# Patient Record
Sex: MALE | Race: ASIAN | NOT HISPANIC OR LATINO | Employment: STUDENT | URBAN - METROPOLITAN AREA
[De-identification: names, ages, dates, MRNs, and addresses within clinical notes are randomized per-mention and may not be internally consistent; named-entity substitution may affect disease eponyms.]

---
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
## 2024-06-17 PROBLEM — Z76.89 HEALTH CARE HOME: Status: RESOLVED | Noted: 2020-12-09 | Resolved: 2024-06-17

## 2025-02-27 ENCOUNTER — OFFICE VISIT (OUTPATIENT)
Dept: DERMATOLOGY | Facility: CLINIC | Age: 20
End: 2025-02-27
Payer: COMMERCIAL

## 2025-02-27 DIAGNOSIS — L74.519 FOCAL HYPERHIDROSIS: Primary | ICD-10-CM

## 2025-02-27 RX ORDER — GLYCOPYRROLATE 1 MG/1
1 TABLET ORAL 2 TIMES DAILY
Qty: 60 TABLET | Refills: 2 | Status: SHIPPED | OUTPATIENT
Start: 2025-02-27

## 2025-02-27 ASSESSMENT — PAIN SCALES - GENERAL: PAINLEVEL_OUTOF10: NO PAIN (0)

## 2025-02-27 NOTE — PROGRESS NOTES
[facility] Dermatology Note  Encounter Date: Feb 27, 2025  Office Visit     Dermatology Problem List:  1. Hyperhidrosis  ____________________________________________    Assessment & Plan:  # Hyperhidrosis.   Chronic stable, but becoming more of a social deterrent. We discussed several treatment options and the pros and cons of each including topical anti-perspirants, oral options (anti-cholinergics), iontophoresis.    - Oral glycopyrrolate start 1 mg BID; reviewed common potential side effects including sedation and other CNS anti-cholinergic effects  - Topical anti-perspirant: aluminum chloride 20% once daily to affected areas  - Can consider iontophoresis - provided with printed instructions re: options including DermaDry; understands this would be an out of pocket cost    Follow-up: 3 month(s) in-person, or earlier for new or changing lesions    Staff and Medical Student:     Radha Wood MS4 on 02/27/25    I was present with the medical student who participated in the service and in the documentation.  I have verified the history and personally performed the physical exam and medical decision making.  I agree with the assessment and plan of care as documented in the note.      Etienne Avery MD  Dermatology Attending  ____________________________________________    CC: Derm Problem (Pt reports excessive sweating, mostly on palms, feet, and armpits. )    HPI:  Mr. Aris Pires is a(n) 19 year old male who presents today as a new patient for chronic sweating palms, feet, armpits.    Hand sweatiness since a young boy; has been the same amount just more bothersome now with social interactions. Worse with nervous, increased heart rate when exercising, caffeine. Has tried hand washing frequently, showering, cold temperature which helps some.    No immediate family history of similar. Otherwise healthy, no medications.    Patient is otherwise feeling well, without additional skin  concerns.    Labs:  None reviewed.    Physical Exam:  Vitals: There were no vitals taken for this visit.  SKIN: Focused examination of hands was performed.  - Sweaty palms  - No other lesions of concern on areas examined.     Medications:  No current outpatient medications on file.     No current facility-administered medications for this visit.      Past Medical History:   Patient Active Problem List   Diagnosis    ACP (advance care planning)     History reviewed. No pertinent past medical history.     CC Referred Self, MD  No address on file on close of this encounter.

## 2025-02-27 NOTE — PATIENT INSTRUCTIONS
- We will plan for oral glycopyrrolate to be taken twice a day; side effects to watch out for including sleepiness, dry mouth, decreased urination  - Prescription sent for a topical anti-perspirant  - Another option to consider is iontophoresis treatment (Dermadry machine - currently on sale)  - Follow up in 3 months

## 2025-02-27 NOTE — NURSING NOTE
Dermatology Rooming Note    Aris Pires's goals for this visit include:   Chief Complaint   Patient presents with    Derm Problem     Pt reports excessive sweating, mostly on palms, feet, and armpits.      ERIK Foster

## 2025-02-27 NOTE — LETTER
2/27/2025       RE: rAis Pires  [address]  [facility] [address]     Dear Colleague,    Thank you for referring your patient, Aris Pires, to the [facility] [place] [facility]. Please see a copy of my visit note below.    [facility] Dermatology Note  Encounter Date: Feb 27, 2025  Office Visit     Dermatology Problem List:  1. Hyperhidrosis  ____________________________________________    Assessment & Plan:  # Hyperhidrosis.   Chronic stable, but becoming more of a social deterrent. We discussed several treatment options and the pros and cons of each including topical anti-perspirants, oral options (anti-cholinergics), iontophoresis.    - Oral glycopyrrolate start 1 mg BID; reviewed common potential side effects including sedation and other CNS anti-cholinergic effects  - Topical anti-perspirant: aluminum chloride 20% once daily to affected areas  - Can consider iontophoresis - provided with printed instructions re: options including DermaDry; understands this would be an out of pocket cost    Follow-up: 3 month(s) in-person, or earlier for new or changing lesions    Staff and Medical Student:     Radha Wood, MS4 on 02/27/25    I was present with the medical student who participated in the service and in the documentation.  I have verified the history and personally performed the physical exam and medical decision making.  I agree with the assessment and plan of care as documented in the note.      Etienne Avery MD  Dermatology Attending  ____________________________________________    CC: Derm Problem (Pt reports excessive sweating, mostly on palms, feet, and armpits. )    HPI:  Mr. Aris Pires is a(n) 19 year old male who presents today as a new patient for chronic sweating palms, feet, armpits.    Hand sweatiness since a young boy; has been the same amount just more bothersome now with social interactions.  Worse with nervous, increased heart rate when exercising, caffeine. Has tried hand washing frequently, showering, cold temperature which helps some.    No immediate family history of similar. Otherwise healthy, no medications.    Patient is otherwise feeling well, without additional skin concerns.    Labs:  None reviewed.    Physical Exam:  Vitals: There were no vitals taken for this visit.  SKIN: Focused examination of hands was performed.  - Sweaty palms  - No other lesions of concern on areas examined.     Medications:  No current outpatient medications on file.     No current facility-administered medications for this visit.      Past Medical History:   Patient Active Problem List   Diagnosis     ACP (advance care planning)     History reviewed. No pertinent past medical history.     CC Referred Self, MD  No address on file on close of this encounter.      Again, thank you for allowing me to participate in the care of your patient.      Sincerely,    Etienne Avery MD

## 2025-03-13 PROBLEM — L74.519 FOCAL HYPERHIDROSIS: Status: ACTIVE | Noted: 2025-03-13

## 2025-03-15 ENCOUNTER — HEALTH MAINTENANCE LETTER (OUTPATIENT)
Age: 20
End: 2025-03-15